# Patient Record
Sex: MALE | Race: WHITE | NOT HISPANIC OR LATINO | Employment: UNEMPLOYED | URBAN - METROPOLITAN AREA
[De-identification: names, ages, dates, MRNs, and addresses within clinical notes are randomized per-mention and may not be internally consistent; named-entity substitution may affect disease eponyms.]

---

## 2021-07-21 ENCOUNTER — HOSPITAL ENCOUNTER (EMERGENCY)
Facility: HOSPITAL | Age: 46
Discharge: HOME | End: 2021-07-21
Attending: EMERGENCY MEDICINE | Admitting: EMERGENCY MEDICINE

## 2021-07-21 VITALS
WEIGHT: 214.51 LBS | RESPIRATION RATE: 17 BRPM | OXYGEN SATURATION: 97 % | HEIGHT: 74 IN | DIASTOLIC BLOOD PRESSURE: 87 MMHG | BODY MASS INDEX: 27.53 KG/M2 | HEART RATE: 86 BPM | TEMPERATURE: 98.5 F | SYSTOLIC BLOOD PRESSURE: 113 MMHG

## 2021-07-21 DIAGNOSIS — Z00.00 NORMAL EXAM: ICD-10-CM

## 2021-07-21 DIAGNOSIS — Z76.0 MEDICATION REFILL: Primary | ICD-10-CM

## 2021-07-21 PROCEDURE — 99282 EMERGENCY DEPT VISIT SF MDM: CPT

## 2021-07-21 RX ORDER — ARIPIPRAZOLE 15 MG/1
15 TABLET ORAL NIGHTLY
Qty: 30 TABLET | Refills: 0 | Status: SHIPPED | OUTPATIENT
Start: 2021-07-21

## 2021-07-21 RX ORDER — CARBAMAZEPINE 200 MG/1
400 TABLET ORAL 2 TIMES DAILY
Qty: 120 TABLET | Refills: 0 | Status: SHIPPED | OUTPATIENT
Start: 2021-07-21

## 2021-07-21 RX ORDER — GABAPENTIN 300 MG/1
300 CAPSULE ORAL 4 TIMES DAILY
Qty: 60 CAPSULE | Refills: 0 | Status: SHIPPED | OUTPATIENT
Start: 2021-07-21

## 2021-07-21 RX ORDER — BENZTROPINE MESYLATE 1 MG/1
1 TABLET ORAL 2 TIMES DAILY
Qty: 60 TABLET | Refills: 0 | Status: SHIPPED | OUTPATIENT
Start: 2021-07-21

## 2021-07-21 NOTE — ED PROVIDER NOTES
"Time: 12:58 PM EDT  Arrived by: private vehicle  Chief Complaint: medication refill  History provided by: patient  History is limited by: N/A    History of Present Illness:  Patient is a 45 y.o. year old male that presents to the emergency department with medication refill. Pt has been unable to get an appointment since they just changed him to a new therapist and is running low on his medications. Pt states he's been feeling \"weird\" and he's missed doses of his Gabapentin.     HPI        Similar Symptoms Previously: no  Recently seen: yes      Patient Care Team  Primary Care Provider: Ami Johnson MD      Past Medical History:     Allergies   Allergen Reactions   • Lithium Hives     History reviewed. No pertinent past medical history.  History reviewed. No pertinent surgical history.  History reviewed. No pertinent family history.    Home Medications:  Prior to Admission medications    Not on File        Social History:   PT  has no history on file for tobacco use, alcohol use, and drug use.    Record Review:  I have reviewed the patient's records in "[employer].     Review of Systems  Review of Systems   Constitutional: Negative for chills and fever.   HENT: Negative for nosebleeds.    Eyes: Negative for redness.   Respiratory: Negative for cough and shortness of breath.    Cardiovascular: Negative for chest pain.   Gastrointestinal: Negative for diarrhea and vomiting.   Genitourinary: Negative for dysuria and frequency.   Musculoskeletal: Negative for back pain and neck pain.   Skin: Negative for rash.   Neurological: Negative for seizures.        Physical Exam  /87 (BP Location: Left arm, Patient Position: Sitting)   Pulse 86   Temp 98.5 °F (36.9 °C) (Oral)   Resp 17   Ht 188 cm (74\")   Wt 97.3 kg (214 lb 8.1 oz)   SpO2 97%   BMI 27.54 kg/m²     Physical Exam  Vitals and nursing note reviewed.   Constitutional:       General: He is awake. He is not in acute distress.  HENT:      Head: Normocephalic " "and atraumatic.      Nose: Nose normal.      Mouth/Throat:      Mouth: Mucous membranes are moist.   Eyes:      General: No scleral icterus.     Pupils: Pupils are equal, round, and reactive to light.   Cardiovascular:      Rate and Rhythm: Normal rate and regular rhythm.      Pulses: Normal pulses.      Heart sounds: Normal heart sounds. No murmur heard.     Pulmonary:      Effort: Pulmonary effort is normal. No respiratory distress.      Breath sounds: Normal breath sounds and air entry. No decreased air movement. No decreased breath sounds, wheezing, rhonchi or rales.   Chest:      Chest wall: No tenderness.   Abdominal:      Palpations: Abdomen is soft.      Tenderness: There is no abdominal tenderness. There is no guarding or rebound.      Hernia: No hernia is present.   Musculoskeletal:         General: No tenderness. Normal range of motion.      Cervical back: Normal range of motion and neck supple. No tenderness.      Right lower leg: No edema.      Left lower leg: No edema.   Skin:     General: Skin is warm and dry.      Findings: No rash.   Neurological:      Mental Status: He is alert and oriented to person, place, and time. Mental status is at baseline.      Sensory: Sensation is intact. No sensory deficit.      Motor: Motor function is intact. No weakness.   Psychiatric:         Mood and Affect: Mood normal.         Behavior: Behavior normal.                  ED Course  /87 (BP Location: Left arm, Patient Position: Sitting)   Pulse 86   Temp 98.5 °F (36.9 °C) (Oral)   Resp 17   Ht 188 cm (74\")   Wt 97.3 kg (214 lb 8.1 oz)   SpO2 97%   BMI 27.54 kg/m²   No results found for this or any previous visit.  Medications - No data to display  No results found.    Procedures/EKGs:  Procedures            Medical Decision Making:                     [facility]     This patient is a pleasant 45-year-old male with history of mood disorder who presents essentially for needing medications refilled, including " Abilify, carbamazepine, Cogentin and Neurontin.    He is in the process of having his therapists changed, and was unable to get a refill in a timely fashion.    He has a normal physical exam and normal vital signs and I do not see any evidence of acute psychosis or suicidal ideation.    I am going to give him a short refill on his psych meds until he can get in with his therapist this week.      Final diagnoses:   Medication refill   Normal exam        Disposition:  ED Disposition     ED Disposition Condition Comment    Discharge Stable           Documentation assistance provided by Marbella Sharma acting as scribe for Yossi Lockett MD. Information recorded by the scribe was done at my direction and has been verified and validated by me.        Marbella Sharma  07/21/21 1318       Marbella Sharma  07/21/21 2793       Yossi Lockett MD  07/21/21 2392

## 2024-03-27 ENCOUNTER — HOSPITAL ENCOUNTER (EMERGENCY)
Facility: HOSPITAL | Age: 49
Discharge: PSYCHIATRIC OR SUBSTANCE USE FACILITY | DRG: 885 | End: 2024-03-27
Attending: EMERGENCY MEDICINE | Admitting: EMERGENCY MEDICINE
Payer: MEDICARE

## 2024-03-27 ENCOUNTER — HOSPITAL ENCOUNTER (INPATIENT)
Facility: HOSPITAL | Age: 49
LOS: 3 days | Discharge: HOME | DRG: 885 | End: 2024-03-30
Attending: PSYCHIATRY & NEUROLOGY | Admitting: PSYCHIATRY & NEUROLOGY
Payer: MEDICARE

## 2024-03-27 VITALS
BODY MASS INDEX: 24.56 KG/M2 | HEIGHT: 75 IN | RESPIRATION RATE: 21 BRPM | HEART RATE: 62 BPM | WEIGHT: 197.53 LBS | OXYGEN SATURATION: 100 % | TEMPERATURE: 97.8 F | DIASTOLIC BLOOD PRESSURE: 75 MMHG | SYSTOLIC BLOOD PRESSURE: 117 MMHG

## 2024-03-27 DIAGNOSIS — R44.0 AUDITORY HALLUCINATIONS: Primary | ICD-10-CM

## 2024-03-27 DIAGNOSIS — F20.1 DISORGANIZED SCHIZOPHRENIA: ICD-10-CM

## 2024-03-27 PROBLEM — F29 PSYCHOSIS: Status: ACTIVE | Noted: 2024-03-27

## 2024-03-27 LAB
ALBUMIN SERPL-MCNC: 4.1 G/DL (ref 3.5–5.2)
ALBUMIN/GLOB SERPL: 1.5 G/DL
ALP SERPL-CCNC: 57 U/L (ref 39–117)
ALT SERPL W P-5'-P-CCNC: 16 U/L (ref 1–41)
AMPHET+METHAMPHET UR QL: NEGATIVE
ANION GAP SERPL CALCULATED.3IONS-SCNC: 7.6 MMOL/L (ref 5–15)
APAP SERPL-MCNC: <5 MCG/ML (ref 0–30)
AST SERPL-CCNC: 14 U/L (ref 1–40)
BARBITURATES UR QL SCN: NEGATIVE
BASOPHILS # BLD AUTO: 0.07 10*3/MM3 (ref 0–0.2)
BASOPHILS NFR BLD AUTO: 1.3 % (ref 0–1.5)
BENZODIAZ UR QL SCN: NEGATIVE
BILIRUB SERPL-MCNC: 0.2 MG/DL (ref 0–1.2)
BUN SERPL-MCNC: 7 MG/DL (ref 6–20)
BUN/CREAT SERPL: 8.6 (ref 7–25)
CALCIUM SPEC-SCNC: 8.8 MG/DL (ref 8.6–10.5)
CANNABINOIDS SERPL QL: NEGATIVE
CHLORIDE SERPL-SCNC: 94 MMOL/L (ref 98–107)
CO2 SERPL-SCNC: 26.4 MMOL/L (ref 22–29)
COCAINE UR QL: NEGATIVE
CREAT SERPL-MCNC: 0.81 MG/DL (ref 0.76–1.27)
DEPRECATED RDW RBC AUTO: 42.2 FL (ref 37–54)
EGFRCR SERPLBLD CKD-EPI 2021: 108.8 ML/MIN/1.73
EOSINOPHIL # BLD AUTO: 0.16 10*3/MM3 (ref 0–0.4)
EOSINOPHIL NFR BLD AUTO: 3 % (ref 0.3–6.2)
ERYTHROCYTE [DISTWIDTH] IN BLOOD BY AUTOMATED COUNT: 12.6 % (ref 12.3–15.4)
ETHANOL BLD-MCNC: <10 MG/DL (ref 0–10)
ETHANOL UR QL: <0.01 %
FENTANYL UR-MCNC: NEGATIVE NG/ML
GLOBULIN UR ELPH-MCNC: 2.7 GM/DL
GLUCOSE SERPL-MCNC: 95 MG/DL (ref 65–99)
HCT VFR BLD AUTO: 34.1 % (ref 37.5–51)
HGB BLD-MCNC: 11.9 G/DL (ref 13–17.7)
HOLD SPECIMEN: NORMAL
HOLD SPECIMEN: NORMAL
IMM GRANULOCYTES # BLD AUTO: 0.01 10*3/MM3 (ref 0–0.05)
IMM GRANULOCYTES NFR BLD AUTO: 0.2 % (ref 0–0.5)
LYMPHOCYTES # BLD AUTO: 1.99 10*3/MM3 (ref 0.7–3.1)
LYMPHOCYTES NFR BLD AUTO: 36.9 % (ref 19.6–45.3)
MCH RBC QN AUTO: 31.9 PG (ref 26.6–33)
MCHC RBC AUTO-ENTMCNC: 34.9 G/DL (ref 31.5–35.7)
MCV RBC AUTO: 91.4 FL (ref 79–97)
METHADONE UR QL SCN: NEGATIVE
MONOCYTES # BLD AUTO: 0.41 10*3/MM3 (ref 0.1–0.9)
MONOCYTES NFR BLD AUTO: 7.6 % (ref 5–12)
NEUTROPHILS NFR BLD AUTO: 2.76 10*3/MM3 (ref 1.7–7)
NEUTROPHILS NFR BLD AUTO: 51 % (ref 42.7–76)
NRBC BLD AUTO-RTO: 0 /100 WBC (ref 0–0.2)
OPIATES UR QL: NEGATIVE
OXYCODONE UR QL SCN: NEGATIVE
PLATELET # BLD AUTO: 289 10*3/MM3 (ref 140–450)
PMV BLD AUTO: 9.2 FL (ref 6–12)
POTASSIUM SERPL-SCNC: 4 MMOL/L (ref 3.5–5.2)
PROT SERPL-MCNC: 6.8 G/DL (ref 6–8.5)
RBC # BLD AUTO: 3.73 10*6/MM3 (ref 4.14–5.8)
SALICYLATES SERPL-MCNC: <0.3 MG/DL
SODIUM SERPL-SCNC: 128 MMOL/L (ref 136–145)
WBC NRBC COR # BLD AUTO: 5.4 10*3/MM3 (ref 3.4–10.8)
WHOLE BLOOD HOLD COAG: NORMAL
WHOLE BLOOD HOLD SPECIMEN: NORMAL

## 2024-03-27 PROCEDURE — 80053 COMPREHEN METABOLIC PANEL: CPT

## 2024-03-27 PROCEDURE — 80307 DRUG TEST PRSMV CHEM ANLYZR: CPT | Performed by: EMERGENCY MEDICINE

## 2024-03-27 PROCEDURE — 85025 COMPLETE CBC W/AUTO DIFF WBC: CPT

## 2024-03-27 PROCEDURE — 36415 COLL VENOUS BLD VENIPUNCTURE: CPT

## 2024-03-27 PROCEDURE — 80179 DRUG ASSAY SALICYLATE: CPT

## 2024-03-27 PROCEDURE — 99285 EMERGENCY DEPT VISIT HI MDM: CPT

## 2024-03-27 PROCEDURE — 80143 DRUG ASSAY ACETAMINOPHEN: CPT

## 2024-03-27 PROCEDURE — 82077 ASSAY SPEC XCP UR&BREATH IA: CPT

## 2024-03-27 RX ORDER — RISPERIDONE 3 MG/1
3 TABLET ORAL 2 TIMES DAILY
Status: DISCONTINUED | OUTPATIENT
Start: 2024-03-27 | End: 2024-03-28

## 2024-03-27 RX ORDER — HALOPERIDOL 5 MG/1
5 TABLET ORAL EVERY 4 HOURS PRN
Status: DISCONTINUED | OUTPATIENT
Start: 2024-03-27 | End: 2024-03-30 | Stop reason: HOSPADM

## 2024-03-27 RX ORDER — HYDROXYZINE PAMOATE 50 MG/1
50 CAPSULE ORAL EVERY 6 HOURS PRN
Status: DISCONTINUED | OUTPATIENT
Start: 2024-03-27 | End: 2024-03-30 | Stop reason: HOSPADM

## 2024-03-27 RX ORDER — GABAPENTIN 300 MG/1
300 CAPSULE ORAL 3 TIMES DAILY
Status: DISCONTINUED | OUTPATIENT
Start: 2024-03-27 | End: 2024-03-30 | Stop reason: HOSPADM

## 2024-03-27 RX ORDER — HYDROXYZINE HYDROCHLORIDE 10 MG/1
1 TABLET, FILM COATED ORAL 3 TIMES DAILY PRN
COMMUNITY
Start: 2024-02-29 | End: 2024-03-30 | Stop reason: HOSPADM

## 2024-03-27 RX ORDER — DIPHENHYDRAMINE HCL 50 MG
50 CAPSULE ORAL EVERY 4 HOURS PRN
Status: DISCONTINUED | OUTPATIENT
Start: 2024-03-27 | End: 2024-03-30 | Stop reason: HOSPADM

## 2024-03-27 RX ORDER — SODIUM CHLORIDE 0.9 % (FLUSH) 0.9 %
10 SYRINGE (ML) INJECTION AS NEEDED
Status: DISCONTINUED | OUTPATIENT
Start: 2024-03-27 | End: 2024-03-27 | Stop reason: HOSPADM

## 2024-03-27 RX ORDER — ALUMINA, MAGNESIA, AND SIMETHICONE 2400; 2400; 240 MG/30ML; MG/30ML; MG/30ML
15 SUSPENSION ORAL EVERY 6 HOURS PRN
Status: DISCONTINUED | OUTPATIENT
Start: 2024-03-27 | End: 2024-03-30 | Stop reason: HOSPADM

## 2024-03-27 RX ORDER — LORAZEPAM 2 MG/ML
2 INJECTION INTRAMUSCULAR EVERY 4 HOURS PRN
Status: DISCONTINUED | OUTPATIENT
Start: 2024-03-27 | End: 2024-03-30 | Stop reason: HOSPADM

## 2024-03-27 RX ORDER — CARBAMAZEPINE 200 MG/1
400 TABLET ORAL 2 TIMES DAILY
Status: DISCONTINUED | OUTPATIENT
Start: 2024-03-27 | End: 2024-03-30 | Stop reason: HOSPADM

## 2024-03-27 RX ORDER — LORAZEPAM 2 MG/1
2 TABLET ORAL EVERY 4 HOURS PRN
Status: DISCONTINUED | OUTPATIENT
Start: 2024-03-27 | End: 2024-03-30 | Stop reason: HOSPADM

## 2024-03-27 RX ORDER — ARIPIPRAZOLE 20 MG/1
20 TABLET ORAL EVERY MORNING
COMMUNITY
Start: 2024-03-06 | End: 2024-03-30 | Stop reason: HOSPADM

## 2024-03-27 RX ORDER — LOPERAMIDE HYDROCHLORIDE 2 MG/1
2 CAPSULE ORAL
Status: DISCONTINUED | OUTPATIENT
Start: 2024-03-27 | End: 2024-03-30 | Stop reason: HOSPADM

## 2024-03-27 RX ORDER — NICOTINE 21 MG/24HR
1 PATCH, TRANSDERMAL 24 HOURS TRANSDERMAL DAILY PRN
Status: DISCONTINUED | OUTPATIENT
Start: 2024-03-27 | End: 2024-03-30 | Stop reason: HOSPADM

## 2024-03-27 RX ORDER — TRAZODONE HYDROCHLORIDE 50 MG/1
100 TABLET ORAL NIGHTLY PRN
Status: DISCONTINUED | OUTPATIENT
Start: 2024-03-27 | End: 2024-03-30 | Stop reason: HOSPADM

## 2024-03-27 RX ORDER — HALOPERIDOL 5 MG/ML
5 INJECTION INTRAMUSCULAR EVERY 4 HOURS PRN
Status: DISCONTINUED | OUTPATIENT
Start: 2024-03-27 | End: 2024-03-30 | Stop reason: HOSPADM

## 2024-03-27 RX ORDER — ACETAMINOPHEN 325 MG/1
650 TABLET ORAL EVERY 4 HOURS PRN
Status: DISCONTINUED | OUTPATIENT
Start: 2024-03-27 | End: 2024-03-30 | Stop reason: HOSPADM

## 2024-03-27 RX ORDER — ACETAMINOPHEN 500 MG
1000 TABLET ORAL ONCE
Status: COMPLETED | OUTPATIENT
Start: 2024-03-27 | End: 2024-03-27

## 2024-03-27 RX ORDER — DIPHENHYDRAMINE HYDROCHLORIDE 50 MG/ML
50 INJECTION INTRAMUSCULAR; INTRAVENOUS EVERY 4 HOURS PRN
Status: DISCONTINUED | OUTPATIENT
Start: 2024-03-27 | End: 2024-03-30 | Stop reason: HOSPADM

## 2024-03-27 RX ADMIN — ACETAMINOPHEN 1000 MG: 500 TABLET ORAL at 20:24

## 2024-03-27 NOTE — ED PROVIDER NOTES
Time: 2:39 PM EDT  Date of encounter:  3/27/2024  Independent Historian/Clinical History and Information was obtained by:   Patient and Nursing Staff    History is limited by: N/A    Chief Complaint: Disorganized thoughts, paranoid schizophrenia      History of Present Illness:  Patientkaren is a 48 y.o. year old male with history of paranoid schizophrenia and on Abilify who presents to the emergency department for evaluation of worsening visual and auditory hallucinations were voiced things are telling him to get up and leave his house.    He denies any suicidal or homicidal thoughts but is having some difficulty describing his issues due to disorganized thoughts.    He is also concerned that the voices are worse than normal and he may have missed a couple doses of his medications and he feels that he needs a psychiatric eval.    It sounds like he is having some difficulty remembering if even has his Abilify or other meds.    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    HPI    Patient Care Team  Primary Care Provider: Ami Johnson MD    Past Medical History:     Allergies   Allergen Reactions    Lithium Hives     Past Medical History:   Diagnosis Date    Anxiety     Bipolar 1 disorder      History reviewed. No pertinent surgical history.  History reviewed. No pertinent family history.    Home Medications:  Prior to Admission medications    Medication Sig Start Date End Date Taking? Authorizing Provider   hydrOXYzine (ATARAX) 10 MG tablet Take 1 tablet by mouth 3 times a day. 2/29/24  Yes Provider, MD Lynette   ARIPiprazole (Abilify) 15 MG tablet Take 1 tablet by mouth Every Night. 7/21/21   Yossi Lockett MD   benztropine (COGENTIN) 1 MG tablet Take 1 tablet by mouth 2 (Two) Times a Day. 7/21/21   Yossi Lockett MD   carBAMazepine (TEGretol) 200 MG tablet Take 2 tablets by mouth 2 (Two) Times a Day. 7/21/21   Yossi Lockett MD   gabapentin (NEURONTIN) 300 MG capsule Take 1  "capsule by mouth 4 (Four) Times a Day. 7/21/21   Yossi Lockett MD        Social History:   Social History     Tobacco Use    Smoking status: Every Day     Types: Cigarettes    Smokeless tobacco: Current     Types: Chew   Substance Use Topics    Drug use: Not Currently         Review of Systems:  Review of Systems   I performed a 10 point review of systems which was all negative, except for the positives found in the HPI above.  Physical Exam:  /86 (BP Location: Right arm, Patient Position: Lying)   Pulse 71   Temp 97.8 °F (36.6 °C) (Oral)   Resp 21   Ht 190.5 cm (75\")   Wt 89.6 kg (197 lb 8.5 oz)   SpO2 100%   BMI 24.69 kg/m²     Physical Exam   General: Awake alert and in no obvious distress, but difficult historian due to scattered or disorganized thoughts    HEENT: Head normocephalic atraumatic, eyes PERRLA EOMI, nose normal, oropharynx normal.    Neck: Supple full range of motion, no meningismus, no lymphadenopathy    Heart: Regular rate and rhythm, no murmurs or rubs, 2+ radial pulses bilaterally    Lungs: Clear to auscultation bilaterally without wheezes or crackles, no respiratory distress    Abdomen: Soft, nontender, nondistended, no rebound or guarding    Skin: Warm, dry, no rash    Musculoskeletal: Normal range of motion, no lower extremity edema    Neurologic: Oriented x3, no motor deficits no sensory deficits    Psychiatric: Mood appears to show signs of mild psychosis but is calm and cooperative, no suicidal or homicidal ideations, reports auditory hallucinations, disorganized thoughts noted concerning for schizophrenia          Procedures:  Procedures      Medical Decision Making:      Comorbidities that affect care:    Paranoid schizophrenia    External Notes reviewed:    Previous Labs: Previous baseline labs are unremarkable.      The following orders were placed and all results were independently analyzed by me:  Orders Placed This Encounter   Procedures    [place] Draw    " Comprehensive Metabolic Panel    Acetaminophen Level    Ethanol    Salicylate Level    Urine Drug Screen - Urine, Clean Catch    CBC Auto Differential    NPO Diet NPO Type: Strict NPO    Continuous Pulse Oximetry    Vital Signs    Undress & Gown    Psych / Access to See    IP General Consult (Use specialty-specific consult if known)    Oxygen Therapy- Nasal Cannula; Titrate 1-6 LPM Per SpO2; 90 - 95%    POC Glucose Once    Insert Peripheral IV    CBC & Differential    Green Top (Gel)    Lavender Top    Gold Top - SST    Light Blue Top       Medications Given in the Emergency Department:  Medications   sodium chloride 0.9 % flush 10 mL (has no administration in time range)        ED Course:         Labs:    Lab Results (last 24 hours)       Procedure Component Value Units Date/Time    CBC & Differential [[id number]]  (Abnormal) Collected: 03/27/24 1329    Specimen: Blood Updated: 03/27/24 1335    Narrative:      The following orders were created for panel order CBC & Differential.  Procedure                               Abnormality         Status                     ---------                               -----------         ------                     CBC Auto Differential[[id number]]        Abnormal            Final result                 Please view results for these tests on the individual orders.    Comprehensive Metabolic Panel [[id number]]  (Abnormal) Collected: 03/27/24 1329    Specimen: Blood Updated: 03/27/24 1357     Glucose 95 mg/dL      BUN 7 mg/dL      Creatinine 0.81 mg/dL      Sodium 128 mmol/L      Potassium 4.0 mmol/L      Chloride 94 mmol/L      CO2 26.4 mmol/L      Calcium 8.8 mg/dL      Total Protein 6.8 g/dL      Albumin 4.1 g/dL      ALT (SGPT) 16 U/L      AST (SGOT) 14 U/L      Alkaline Phosphatase 57 U/L      Total Bilirubin 0.2 mg/dL      Globulin 2.7 gm/dL      A/G Ratio 1.5 g/dL      BUN/Creatinine Ratio 8.6     Anion Gap 7.6 mmol/L      eGFR 108.8 mL/min/1.73     Narrative:      GFR Normal  >60  Chronic Kidney Disease <60  Kidney Failure <15      Acetaminophen Level [[id number]]  (Normal) Collected: 03/27/24 1329    Specimen: Blood Updated: 03/27/24 1357     Acetaminophen <5.0 mcg/mL     Ethanol [[id number]] Collected: 03/27/24 1329    Specimen: Blood Updated: 03/27/24 1357     Ethanol <10 mg/dL      Ethanol % <0.010 %     Narrative:      Ethanol (Plasma)  <10 Essentially Negative    Toxic Concentrations           mg/dL    Flushing, slowing of reflexes    Impaired visual activity         Depression of CNS              >100  Possible Coma                  >300       Salicylate Level [[id number]]  (Normal) Collected: 03/27/24 1329    Specimen: Blood Updated: 03/27/24 1357     Salicylate <0.3 mg/dL     CBC Auto Differential [[id number]]  (Abnormal) Collected: 03/27/24 1329    Specimen: Blood Updated: 03/27/24 1335     WBC 5.40 10*3/mm3      RBC 3.73 10*6/mm3      Hemoglobin 11.9 g/dL      Hematocrit 34.1 %      MCV 91.4 fL      MCH 31.9 pg      MCHC 34.9 g/dL      RDW 12.6 %      RDW-SD 42.2 fl      MPV 9.2 fL      Platelets 289 10*3/mm3      Neutrophil % 51.0 %      Lymphocyte % 36.9 %      Monocyte % 7.6 %      Eosinophil % 3.0 %      Basophil % 1.3 %      Immature Grans % 0.2 %      Neutrophils, Absolute 2.76 10*3/mm3      Lymphocytes, Absolute 1.99 10*3/mm3      Monocytes, Absolute 0.41 10*3/mm3      Eosinophils, Absolute 0.16 10*3/mm3      Basophils, Absolute 0.07 10*3/mm3      Immature Grans, Absolute 0.01 10*3/mm3      nRBC 0.0 /100 WBC     Urine Drug Screen - Urine, Clean Catch [[id number]]  (Normal) Collected: 03/27/24 1449    Specimen: Urine, Clean Catch Updated: 03/27/24 1514     Amphet/Methamphet, Screen Negative     Barbiturates Screen, Urine Negative     Benzodiazepine Screen, Urine Negative     Cocaine Screen, Urine Negative     Opiate Screen Negative     THC, Screen, Urine Negative     Methadone Screen, Urine Negative     Oxycodone Screen, Urine Negative     Fentanyl, Urine Negative     Narrative:      Negative Thresholds Per Drugs Screened:    Amphetamines                 500 ng/ml  Barbiturates                 200 ng/ml  Benzodiazepines              100 ng/ml  Cocaine                      300 ng/ml  Methadone                    300 ng/ml  Opiates                      300 ng/ml  Oxycodone                    100 ng/ml  THC                           50 ng/ml  Fentanyl                       5 ng/ml      The Normal Value for all drugs tested is negative. This report includes final unconfirmed screening results to be used for medical treatment purposes only. Unconfirmed results must not be used for non-medical purposes such as employment or legal testing. Clinical consideration should be applied to any drug of abuse test, particularly when unconfirmed results are used.                     Imaging:    No Radiology Exams Resulted Within Past 24 Hours      Differential Diagnosis and Discussion:    Psychiatric: Differential diagnosis includes but is not limited to depression, psychosis, bipolar disorder, anxiety, manic episode, schizophrenia, and substance abuse.    All labs were reviewed and interpreted by me.    MDM     Amount and/or Complexity of Data Reviewed  Clinical lab tests: reviewed           This patient is a 48-year-old male with paranoid schizophrenia who has been having worsening disorganized thoughts and auditory hallucinations commanding him to get out of the house the last few days.    It sounds that he may have missed some doses of his Abilify med.    He denies any substance abuse and we will check a urine drug screen and labs to medically clear him.    I suspect he needs evaluated from a psychologic standpoint and we will have our psychiatric clinical worker in the ED evaluate him and come up with a plan for urgent management    He was evaluated by our clinical worker in the ED and felt to have significant disorganized thoughts and unable to be safely restarted on his medications as  he is unable to know if he even still has them and it was felt he would be best managed being admitted to inpatient psych unit on [facility].    I will consult with the on-call psychiatrist at [facility].            Patient Care Considerations:          Consultants/Shared Management Plan:    Consultant: I discussed the plan of management and the patient was also evaluated by our psychiatric clinical  in the ED.    Social Determinants of Health:    Patient is independent, reliable, and has access to care.       Disposition and Care Coordination:    Psychiatric Admission: Through independent evaluation of the patient's history and physical and consultation with psychiatry, the patient meets criteria for admission to a psychiatric facility.        Final diagnoses:   Auditory hallucinations   Disorganized schizophrenia        ED Disposition       ED Disposition   DC/Transfer to Behavioral Health    Condition   Stable    Comment   [facility]               This medical record created using voice recognition software.             Yossi Lockett MD  03/27/24 1229       Yossi Lockett MD  03/27/24 0128

## 2024-03-27 NOTE — SIGNIFICANT NOTE
03/27/24 1545   Behavior WDL   Behavior WDL all;WDL   Interactions cooperative   Motor Movement no unusual gestures/mannerisms   Emotion Mood WDL   Emotion/Mood/Affect WDL all;X   Affect flat   Emotion/Mood anxious;depressed   Patient rated anxiety level 10   Patient rated depression level 8   Mental Health   Little Interest or Pleasure in Doing Things 2-->more than half the days   Feeling Down, Depressed or Hopeless 3-->nearly every day   Speech WDL   Speech WDL speech;WDL   Speech clear, coherent   Perceptual State WDL   Perceptual State WDL all;X   Hallucinations auditory;visual;command   Perceptual State derealization   Thought Process WDL   Thought Process WDL all;X   Delusions paranoid   Judgment and Insight insight not appropriate to situation;judgment not appropriate to situation   Thought Content suspiciousness;ideas of reference   Thought Process illogical;flight of ideas   Intellectual Performance WDL   Intellectual Performance WDL all;WDL   Intellectual Performance able to comprehend   Level of Consciousness Alert   Stress   Do you feel stress - tense, restless, nervous, or anxious, or unable to sleep at night because your mind is troubled all the time - these days? Very much   Coping/Stress   Major Change/Loss/Stressor mental health condition   Patient Personal Strengths strong support system   Sources of Support mental health providers;community support   Techniques to [place] with Loss/Stress/Change counseling;medication   Developmental Stage (Eriksson's)   Developmental Stage Stage 7 (35-65 years/Middle Adulthood) Generativity vs. Stagnation   C-SSRS (Recent)   Q1 Wished to be Dead (Past Month) no   Q2 Suicidal Thoughts (Past Month) no   Q6 Suicide Behavior (Lifetime) no   Level of Risk per Screen screen negative   Violence Risk   Feels Like Hurting Others no   Previous Attempt to Harm Others no       SW met with pt this date at bedside. Pt is here because he is battling anxiety and depression really  "badly and it is affecting his ability to take his medications and function. He states that he is hearing voices and seeing things. The voices that he is hearing are telling him to leave his home. HE feels like he is in a different world. He does not remember the last time he took his medications. He reports to be dx paranoid schizophrenic.  He denies substance abuse. He denies HI and SI. He rates his depression an 8 and anxiety \"off the charts\".     NIKOLAS Neri MSW, CSW   "

## 2024-03-28 PROBLEM — Z96.659 HISTORY OF TOTAL KNEE ARTHROPLASTY: Status: ACTIVE | Noted: 2024-03-28

## 2024-03-28 PROCEDURE — 80156 ASSAY CARBAMAZEPINE TOTAL: CPT | Performed by: PSYCHIATRY & NEUROLOGY

## 2024-03-28 PROCEDURE — 80157 ASSAY CARBAMAZEPINE FREE: CPT | Performed by: PSYCHIATRY & NEUROLOGY

## 2024-03-28 RX ORDER — CYCLOBENZAPRINE HCL 10 MG
10 TABLET ORAL NIGHTLY PRN
COMMUNITY
Start: 2024-02-13 | End: 2024-03-30 | Stop reason: HOSPADM

## 2024-03-28 RX ORDER — ZIPRASIDONE HYDROCHLORIDE 20 MG/1
40 CAPSULE ORAL 2 TIMES DAILY WITH MEALS
Status: DISCONTINUED | OUTPATIENT
Start: 2024-03-28 | End: 2024-03-30 | Stop reason: HOSPADM

## 2024-03-28 RX ADMIN — TRAZODONE HYDROCHLORIDE 100 MG: 50 TABLET ORAL at 21:22

## 2024-03-28 RX ADMIN — GABAPENTIN 300 MG: 300 CAPSULE ORAL at 16:34

## 2024-03-28 RX ADMIN — CARBAMAZEPINE 400 MG: 200 TABLET ORAL at 00:07

## 2024-03-28 RX ADMIN — RISPERIDONE 3 MG: 3 TABLET ORAL at 00:06

## 2024-03-28 RX ADMIN — CARBAMAZEPINE 400 MG: 200 TABLET ORAL at 09:01

## 2024-03-28 RX ADMIN — ACETAMINOPHEN 650 MG: 325 TABLET ORAL at 14:38

## 2024-03-28 RX ADMIN — CARBAMAZEPINE 400 MG: 200 TABLET ORAL at 21:16

## 2024-03-28 RX ADMIN — GABAPENTIN 300 MG: 300 CAPSULE ORAL at 09:01

## 2024-03-28 RX ADMIN — ACETAMINOPHEN 650 MG: 325 TABLET ORAL at 21:22

## 2024-03-28 RX ADMIN — RISPERIDONE 3 MG: 3 TABLET ORAL at 09:01

## 2024-03-28 RX ADMIN — ZIPRASIDONE HCL 40 MG: 20 CAPSULE ORAL at 16:34

## 2024-03-28 RX ADMIN — GABAPENTIN 300 MG: 300 CAPSULE ORAL at 00:07

## 2024-03-28 RX ADMIN — GABAPENTIN 300 MG: 300 CAPSULE ORAL at 21:16

## 2024-03-28 NOTE — NURSING NOTE
"Patient arrived to the unit at 2312, as a Voluntary admission from the ED with a diagnosis of Psychosis. Patient arrived via wheelchair, accompanied by an ED tech and security officers x2.  Patient presents as anxious, with apprehensive affect and was cooperative with admission processing, contraband search and psychosocial assessments. Patient reported [facility] at [facility] \"years and years ago\", but cannot recall when.  Patient reports he has been diagnosed with anxiety, and Schizophrenia in the past.  Patient states he came in tonight \"because I just wasn't feeling right, not myself\" and reports that voices and visual hallucinations have been increasing and causing him to feel fearful and anxious.  Patient reports a voice keeps telling to to leave his home, and he wanted to come in \"before I was a problem to anyone.\" Patient states he lives with his brother and his two daughters and his brother has been very supportive of him.  Patient states he has a current psychiatrist and sees a therapist thru [facility] in [place], [place]. Patient states he has felt more stress, has been feeling overwhelmed by his thoughts, and can't concentrate.  Patient states his sleep has been varied and inconsistent, but appetite has been okay. Patient states he may have missed some of his medications recently, but he can't remember.  Patient reports having had a Rt. Total Knee Arthroplasty on 3/23.2022, and wears an elastic knee brace for joint support.  Order obtained form MD to continue usage here on the unit, and extra pillow provided for support.  Patient states he has been managing pain with Extra strength Tylenol and Gabapentin.  Patient has been cooperative since arrival and is able to make needs known.  Patient was provided with his ordered pm medications, and was oriented to his room and the unit.  Patient denies having suicidal or homicidal thoughts, and CFS.  Supportive care and safe environment provided. "

## 2024-03-28 NOTE — H&P
"[facility] [place]   PSYCHIATRIC  HISTORY AND PHYSICAL    Patient Name: Mark Dale  : 1975  MRN: [id number]  Primary Care Physician:  Ami Johnson MD  Date of admission: 3/27/2024    Subjective   Subjective     Chief Complaint: \"Minor psychosis\"    HPI:     Mark Dale is a 48 y.o. male with no chronic medical problems and a history of psychosis admitted on a voluntary basis for exacerbation of psychosis.    Patient today states that he has trouble thinking.  Patient is unable to complete his thoughts and has difficult time conveying what he is thinking during the interview.  He reports he has not been sleeping well.  Cannot tell me what brought him into the hospital or how he got here.    Reports that he has had hallucinations and they have increased lately.  States his medications are no longer working.  Reports that he has been increasingly anxious because of the hallucinations.  Also has increased anxiety because the medicine is no longer working and he feels that he is \"immune\" to the medications.  He reports he has been compliant with medications.    Reports being depressed recently and has been little desire to do things.  Reports his sleep has been okay.  Reports his energy level has been okay.  Reports he does not feel stable psychiatrically and has a hard time discussing exactly what is wrong.    Patient reports he been stable for a very long time and does not like that he is in his current state.  States that he has not been in the hospital very long time and is chagrined that he is hospitalized at this time and that he has had this decompensation.    Discussed medications and he reports his current medicines were not working and is agreeable to a trial of ziprasidone.    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      Review of Systems:      CONSTITUTIONAL: Feels well denies any acute medical problems  PSYCHIATRIC: As documented " in HPI    Personal History     Past Medical History:   Diagnosis Date    Anxiety     Bipolar 1 disorder        History reviewed. No pertinent surgical history.    Past Psychiatric History: Currently under the care of [facility] services    Psychiatric Hospitalizations: First hospitalization here, reports a long history of multiple hospitalizations but cannot remember the last time he was hospitalized    Suicide Attempts: Denies any history of attempts    Prior Treatment and Medications Tried: Medication trials.  Initially started on Risperdal here and states that medicine does not work for him in the past.      Family History: family history is not on file. Otherwise pertinent FHx was reviewed and not pertinent to current issue.    Family Substance Abuse History:None known to patient      Family Suicide History:None known to patient      Social History:     Unreasonable as well [place].  Moved to [place] because his family was here.  He is a high school graduate.  Currently lives with a brother.  He receives disability.  Has never been  and has no children.    Has never been in the     Identifies a spiritual    Reports a history of emotional    Social History     Socioeconomic History    Marital status: Single   Tobacco Use    Smoking status: Every Day    Smokeless tobacco: Current     Types: Chew   Vaping Use    Vaping status: Never Used   Substance and Sexual Activity    Alcohol use: Never    Drug use: Not Currently    Sexual activity: Not Currently       Substance Abuse History: reports that he has been smoking. His smokeless tobacco use includes chew. He reports that he does not currently use drugs. He reports that he does not drink alcohol.    Home Medications:   ARIPiprazole, benztropine, carBAMazepine, cyclobenzaprine, gabapentin, and hydrOXYzine      Allergies:  Allergies   Allergen Reactions    Lithium Hives       Objective   Objective     Vitals:   Temp:  [97.3 °F (36.3 °C)-97.9 °F (36.6 °C)]  "97.7 °F (36.5 °C)  Heart Rate:  [60-76] 71  Resp:  [16-21] 18  BP: (102-133)/(67-91) 104/67    Physical Exam:      CONSTITUTIONAL: Patient is well developed, well nourished, awake and alert.  HEENT: Head and neck are normocephalic and atraumatic.   LUNGS: Even unlabored respirations.  SKIN: Clean, dry, intact.  EXTREMITIES: No clubbing, cyanosis, edema.  MUSCULOSKELETAL: Symmetric body habitus. Spine straight. Strength intact,  NEUROLOGIC: Appropriate. No abnormal movements, good muscle tone.                              Cerebellar: station and gait steady.    Mental Status Exam:     Awake, alert, oriented male appears appropriate for stated age.  He participates in interview.  Has a hard time conveying his thoughts but is able to track conversation appropriately.  There is no restlessness or agitation.  Appears to be of average intelligence and is a fair historian       Hygiene:   good  Cooperation:  Cooperative  Eye Contact:  Good  Psychomotor Behavior:  Appropriate  Affect:  Restricted and Blunted  Mood: \"Okay, not great\"  Speech:  Normal and Rambling  Language: Appropriate  Thought Process:  Disorganized, Circum, and Tangential  Thought Content:  Bizarre  Suicidal:  None  Homicidal:  None  Hallucinations:  Auditory  Delusion:  Paranoid  Memory:  Intact  Orientation:  Person, Place, Time, and Situation  Reliability:   Limited  Insight:  Fair  Judgement:  Fair  Impulse Control:  Fair        Result Review    Result Review:  I have personally reviewed the results from the time of this admission to 3/28/2024 11:31 EDT and agree with these findings:  [x]  Laboratory  []  Microbiology  []  Radiology  []  EKG/Telemetry   []  Cardiology/Vascular   []  Pathology  []  Old records  []  Other:  Most notable findings include: Toxicology negative    Assessment & Plan   Assessment / Plan     Brief Patient Summary:  Mark Dale is a 48 y.o. male who has a long history of psychotic disorder acute exacerbation and medications " not effective.    Active Hospital Problems:  Active Hospital Problems    Diagnosis     **Psychosis        Plan:   Discussed side effects, risk, benefits of Geodon and he is agreeable to a trial of Geodon  States Tegretol is worked very well for him and wants to continue this medication  Admit for safety and stabilization and begin treatment for underlying mood disorder or psychosis with appropriate medications  Attempt to gain collateral information of possible  Work on safety plan  Provide supportive therapy  Patient to engage in all group and individual treatment modalities available including milieu therapy  Work on appropriate disposition follow-up  Estimated length of stay in hospital 4 to 5 days      DVT prophylaxis:  Mechanical DVT prophylaxis orders are present.        CODE STATUS:    Code Status (Patient has no pulse and is not breathing): CPR (Attempt to Resuscitate)  Medical Interventions (Patient has pulse or is breathing): Full Support      Admission Status:  I believe this patient meets inpatient status.      Part of this note may be an electronic transcription/translation of spoken language to printed text using the Dragon dictation system.        Electronically signed by Frankie Whitley MD, 03/28/24, 11:31 AM EDT.

## 2024-03-28 NOTE — PLAN OF CARE
Goal Outcome Evaluation:  Plan of Care Reviewed With: patient  Patient Agreement with Plan of Care: agrees         Patient rated his anxiety 2 and stated that his depression was decreasing but couldn't rate it on a scale. He denied SI/HI/AVH and contracted for safety. He is cooperative, calm and stated that he has a difficult time putting his thoughts into words and that his body wants to rest but his mind does not let him, these thoughts are about his family and things he is regretting, he couldn't be more specific. He was mostly resting during the day in his bed and able to make his needs know. He stated to be ready to get help. Continue to monitor and provide safe environment.

## 2024-03-29 RX ADMIN — GABAPENTIN 300 MG: 300 CAPSULE ORAL at 20:22

## 2024-03-29 RX ADMIN — CARBAMAZEPINE 400 MG: 200 TABLET ORAL at 20:22

## 2024-03-29 RX ADMIN — GABAPENTIN 300 MG: 300 CAPSULE ORAL at 15:27

## 2024-03-29 RX ADMIN — CARBAMAZEPINE 400 MG: 200 TABLET ORAL at 08:44

## 2024-03-29 RX ADMIN — ZIPRASIDONE HCL 40 MG: 20 CAPSULE ORAL at 16:44

## 2024-03-29 RX ADMIN — ZIPRASIDONE HCL 40 MG: 20 CAPSULE ORAL at 08:45

## 2024-03-29 RX ADMIN — ACETAMINOPHEN 650 MG: 325 TABLET ORAL at 15:26

## 2024-03-29 RX ADMIN — GABAPENTIN 300 MG: 300 CAPSULE ORAL at 08:45

## 2024-03-29 NOTE — PLAN OF CARE
Goal Outcome Evaluation:  Plan of Care Reviewed With: patient  Patient Agreement with Plan of Care: agrees  PATIENT ALERT AND ORIENTED AND CALM AND COOPERATIVE WITH STAFF. PATIENT COMPLIANT WITH ALL MEDICATIONS/. PATIENT DENIES S/I, H/I OR HALLUCINATIONS. PATIENT CONTINUES TO STAY WITHDRAWN TO HIS ROOM . WILL CONTINUE TO MONITOR FOR CHANGES IN MOOD OR BEHAVIOR.

## 2024-03-29 NOTE — PROGRESS NOTES
" [facility]     Psychiatric Progress Note    Patient Name: Mark Dale  : 1975  MRN: [id number]  Primary Care Physician:  Ami Johnson MD  Date of admission: 3/27/2024    Subjective   Subjective     Patient seen and chart reviewed, discussed with staff.    Chief Complaint: Psychosis      HPI:     Staff evelia the patient appears to be better.  He reports reduction in hallucinations.  Slept well last night.  Rated anxiety as a 3 and depression as a 2.  Still staff that he is hopeful and feels better now that he is on medication.    Patient today is isolative and regressed to his room.  He does participate fully in interview.  He is calm and cooperative.  Continues to be somewhat guarded and superficial.  Looks a little suspicious but participates in interview does not exhibit any restlessness.  Reports he slept well.  Denies side effects from medications.  Continues to talk about feeling like being in a different world but cannot fully explain what he means.  Continues to have some bizarre thinking but is showing improvement.    He asked about ziprasidone, which has been initiated in his care, and we again discussed medication need to be taken with meals.  States he has had no problems with that today showing interest in his wellbeing and treatment.        Objective   Objective     Vitals:   Temp:  [97.5 °F (36.4 °C)] 97.5 °F (36.4 °C)  Heart Rate:  [65-74] 65  Resp:  [16-18] 18  BP: ()/(62-72) 112/72          Mental Status Exam:      Appearance:   Well-developed, well-nourished, ADLs well attended to  Reliability:   Fair, difficult time expressing himself but improved from yesterday  Eye Contact:   Good  Concentration/Focus:    Good  Behaviors:    No restlessness or agitation  Memory :    Sensorium intact  Speech:    Normal rate and volume  Language:   Appropriate  Mood :    \"Good\"  Affect:    Blunted, somewhat anxious  Thought process:    Superficial, guarded, tangential, some delusional " and paranoid thinking  Thought Content:    Denies suicidal or homicidal ideation, reports hallucinations but they are improved  Insight:   Good  Judgement:    Intact, no behavioral disturbance      Result Review    Result Review:  I have personally reviewed the results from the time of this admission to 3/29/2024 12:00 EDT and agree with these findings:  []  Laboratory  []  Microbiology  []  Radiology  []  EKG/Telemetry   []  Cardiology/Vascular   []  Pathology  []  Old records  []  Other:  Most notable findings include:     Lab Results (last 24 hours)       ** No results found for the last 24 hours. **                Medications:   carBAMazepine, 400 mg, Oral, BID  gabapentin, 300 mg, Oral, TID  ziprasidone, 40 mg, Oral, BID With Meals          Assessment / Plan       Active Hospital Problems:  Active Hospital Problems    Diagnosis    • **Psychosis        Plan:     Continue current treatment protocol and medications as clinically indicated  Work on mood stabilization and abatement of any suicidal ideation or psychosis.  Work on appropriate safety plan  Continue supportive therapy  Patient to engage in all group and individual treatment modalities available on the unit  Obtain collateral information if possible  Titrate medications as clinically indicated  Work on appropriate disposition follow-up including referrals to substance abuse treatment if indicated      Disposition:  I expect patient to be discharged 1 to 2 days.    Part of this note may be an electronic transcription/translation of spoken language to printed text using the Dragon dictation system.         Electronically signed by Frankie Whitley MD, 03/29/24, 12:00 PM EDT.

## 2024-03-29 NOTE — PLAN OF CARE
Goal Outcome Evaluation:  Plan of Care Reviewed With: patient  Patient Agreement with Plan of Care: agrees     Progress: improving.  Patient has been calm, cooperative, quiet, and withdrawn to room.  Patient states he does feel better, and audio and visual hallucinations have improved.  Patient states he is hopeful now that he is back on medication.  Patient appears less apprehensive than at time of admit. Patient encouraged to make needs known.  Patient was cooperative with snack and pm medications, requesting Tylenol for knee pain and accepted offer of Trazodone after he verbalized difficulty with adequate sleep.  Patient rates depression at 2/10, anxiety at 3/10 and denies S/I, H/I. Emotional support and safe environment provided.

## 2024-03-30 VITALS
TEMPERATURE: 97.3 F | OXYGEN SATURATION: 100 % | DIASTOLIC BLOOD PRESSURE: 85 MMHG | WEIGHT: 201.5 LBS | RESPIRATION RATE: 16 BRPM | HEIGHT: 74 IN | SYSTOLIC BLOOD PRESSURE: 121 MMHG | HEART RATE: 69 BPM | BODY MASS INDEX: 25.86 KG/M2

## 2024-03-30 PROBLEM — F20.9 SCHIZOPHRENIA: Status: ACTIVE | Noted: 2024-03-30

## 2024-03-30 RX ORDER — ZIPRASIDONE HYDROCHLORIDE 40 MG/1
40 CAPSULE ORAL 2 TIMES DAILY WITH MEALS
Qty: 60 CAPSULE | Refills: 1 | Status: SHIPPED | OUTPATIENT
Start: 2024-03-30

## 2024-03-30 RX ADMIN — CARBAMAZEPINE 400 MG: 200 TABLET ORAL at 08:18

## 2024-03-30 RX ADMIN — ACETAMINOPHEN 650 MG: 325 TABLET ORAL at 13:50

## 2024-03-30 RX ADMIN — GABAPENTIN 300 MG: 300 CAPSULE ORAL at 08:18

## 2024-03-30 RX ADMIN — HYDROXYZINE PAMOATE 50 MG: 50 CAPSULE ORAL at 03:19

## 2024-03-30 RX ADMIN — ZIPRASIDONE HCL 40 MG: 20 CAPSULE ORAL at 08:18

## 2024-03-30 NOTE — PLAN OF CARE
"Goal Outcome Evaluation:  Plan of Care Reviewed With: patient  Patient Agreement with Plan of Care: agrees     Progress: improving     Patient rating anxiety 7/10 and depression 2/10. Denies HI/SI. Denies AVH. Patient laying in bed calmly upon approach. Voice is soft spoken and patient mumbles at times. Calm and cooperative with RN assessment. Reports he is the \"middle man\" in his family and he hopes that his time here gives his family an opportunity to rely on someone else for the time being. Med compliant. Able to make needs known. Will continue plan of care and provide a safe patient environment.                              "

## 2024-03-30 NOTE — DISCHARGE SUMMARY
[facility]         DISCHARGE SUMMARY    Patient Name: Mark Dale  : 1975  MRN: [id number]    Date of Admission: 3/27/2024  Date of Discharge: 3/30/2024  Primary Care Physician: Ami Johnson MD    Consults       No orders found from 2024 to 3/28/2024.            Presenting Problem:   Psychosis [F29]    Active and Resolved Hospital Problems:  Active Hospital Problems    Diagnosis POA   • **Psychosis [F29] Yes   • Schizophrenia [F20.9] Yes      Resolved Hospital Problems   No resolved problems to display.         Hospital Course     Hospital Course:  Mark Dale is a 48 y.o. male voluntary basis for exacerbation of psychosis.  He was denying any suicidal ideations.    Patient is calm and cooperative.  Patient reports he has been having trouble with his thinking and felt that psychosis has been more of a problem.  Felt that his medications were not effective he needed to change them.  We discussed use of ziprasidone he was agreeable to a trial of ziprasidone.  We initiated ziprasidone at 40 mg twice daily and he is tolerating medication well.    He did want to continue on Tegretol which was previously ordered and stated that it works very well for him.  Was also continued on gabapentin.    Patient was taken off benztropine as it could have a negative effect on his thought processes and some of his reported confusion.  Patient had improvement of his symptoms since he came into the hospital.  He reported feeling some sort of depersonalization which has abated since he came into the hospital.  He has been free of suicidal ideation since he came into the hospital and has remained free of these.  He is calm and cooperative.  Patient stated he is feeling better and feels like he could manage as an outpatient and is asking for discharge and will discharge today        On day of discharge patient is calm, cooperative, engaging, and has no acute agitation or restlessness.  Speech is  "normal rate and volume and language is appropriate, relevant.  Mood is described as \"good\" and affect is constricted.  Thought processes are sequential, future oriented, goal directed, and thought content is negative for suicidal or homicidal ideations, no hallucinations.  Insight is improved, good, and judgment is intact, behavioral disturbance.      DISCHARGE Follow Up Recommendations for labs and diagnostics: Primary care for routine health maintenance, [place], lipid and glucose monitoring, Tegretol level      Day of Discharge     Vital Signs:  Temp:  [97.3 °F (36.3 °C)-97.9 °F (36.6 °C)] 97.3 °F (36.3 °C)  Heart Rate:  [61-69] 69  Resp:  [16] 16  BP: (106-121)/(68-85) 121/85      Pertinent  and/or Most Recent Results     LAB RESULTS:      Lab 03/27/24  1329   WBC 5.40   HEMOGLOBIN 11.9*   HEMATOCRIT 34.1*   PLATELETS 289   NEUTROS ABS 2.76   IMMATURE GRANS (ABS) 0.01   LYMPHS ABS 1.99   MONOS ABS 0.41   EOS ABS 0.16   MCV 91.4         Lab 03/27/24  1329   SODIUM 128*   POTASSIUM 4.0   CHLORIDE 94*   CO2 26.4   ANION GAP 7.6   BUN 7   CREATININE 0.81   EGFR 108.8   GLUCOSE 95   CALCIUM 8.8         Lab 03/27/24  1329   TOTAL PROTEIN 6.8   ALBUMIN 4.1   GLOBULIN 2.7   ALT (SGPT) 16   AST (SGOT) 14   BILIRUBIN 0.2   ALK PHOS 57                                     Lab 03/27/24  1329   ETHANOL PCT <0.010   ETHANOL MGDL <10         Lab 03/27/24  1449   AMPH/METHAM SCREEN, URINE Negative   BENZODIAZEPINE SCREEN, URINE Negative   COCAINE SCREEN, URINE Negative   OPIATES Negative   THC URINE SCREEN Negative   METHADONE SCREEN, URINE Negative     Brief Urine Lab Results       None               XR Knee 1 -2Vws RT    Result Date: 3/25/2024  Impression: Chief Complaint: History of total knee arthroplasty, right · Date of Exam: 3/25/24 · Procedure Performed: XR KNEE 1 -2VWS RT · Performing MD: Ha Mederos Jr., MD · History: Right TKA, interval evaluation. · Impression: Right knee AP and lateral radiographs were " obtained in clinic today.  These are compared to prior films dated March 23, 2023.  No interval changes.  Again is noted a press-fit, cruciate retaining total knee arthroplasty in appropriate position and alignment.  No evidence of osteolysis or loosening.  Radiographically stable right total knee arthroplasty. Interpreted By: Ha Mederos Jr., MD                 Imaging Results (Last 7 Days)       ** No results found for the last 168 hours. **             Labs Pending at Discharge:  Pending Labs       Order Current Status    Carbamazepine Level, Free & Total In process                 Discharge Details        Discharge Medications        New Medications        Instructions Start Date   ziprasidone 40 MG capsule  Commonly known as: GEODON   40 mg, Oral, 2 Times Daily With Meals             Continue These Medications        Instructions Start Date   carBAMazepine 200 MG tablet  Commonly known as: TEGretol   400 mg, Oral, 2 Times Daily      gabapentin 300 MG capsule  Commonly known as: NEURONTIN   300 mg, Oral, 4 Times Daily             Stop These Medications      ARIPiprazole 20 MG tablet  Commonly known as: ABILIFY     benztropine 1 MG tablet  Commonly known as: COGENTIN     cyclobenzaprine 10 MG tablet  Commonly known as: FLEXERIL     hydrOXYzine 10 MG tablet  Commonly known as: ATARAX              Allergies   Allergen Reactions   • Lithium Hives         Discharge Disposition:  Home or Self Care    Diet:  Hospital:  Diet Order   Procedures   • Diet: Regular/House; Fluid Consistency: Thin (IDDSI 0)         Discharge Activity: Ad nina.  Activity Instructions       Activity as Tolerated              Discharge Condition: Stable    CODE STATUS:  Code Status and Medical Interventions:   Ordered at: 03/27/24 1636     Code Status (Patient has no pulse and is not breathing):    CPR (Attempt to Resuscitate)     Medical Interventions (Patient has pulse or is breathing):    Full Support         No future  appointments.    Additional Instructions for the Follow-ups that You Need to Schedule       Discharge Follow-up with PCP   As directed       Currently Documented PCP:    Ami Johnson MD    PCP Phone Number:    [phone]     Follow Up Details: As needed        Discharge Follow-up with Specified Provider: Jones woodson   As directed      To: Jones woodson                Time spent on Discharge including face to face service: 30 minutes    Part of this note may be an electronic transcription/translation of spoken language to printed text using the Dragon dictation system.        Electronically signed by Frankie Whitley MD, 03/30/24, 1:27 PM EDT.

## 2024-03-30 NOTE — PLAN OF CARE
Goal Outcome Evaluation:  Plan of Care Reviewed With: patient  Patient Agreement with Plan of Care: agrees                              Pt has been wtihdrawnt o room.  He denies SI/HI, AVH and contracts for safety. He rates anxiety 2, depression 3. Pt completed safety plan and is discharging today. He is able to make his needs known.

## 2024-04-01 LAB
CARBAMAZEPINE FREE SERPL-MCNC: 1.1 UG/ML (ref 0.6–4.2)
CARBAMAZEPINE SERPL-MCNC: 4.2 UG/ML (ref 4–12)

## 2025-02-06 ENCOUNTER — HOSPITAL ENCOUNTER (EMERGENCY)
Facility: HOSPITAL | Age: 50
Discharge: HOME | End: 2025-02-06
Attending: EMERGENCY MEDICINE
Payer: MEDICARE

## 2025-02-06 VITALS
OXYGEN SATURATION: 99 % | RESPIRATION RATE: 17 BRPM | SYSTOLIC BLOOD PRESSURE: 126 MMHG | DIASTOLIC BLOOD PRESSURE: 89 MMHG | WEIGHT: 226.19 LBS | TEMPERATURE: 98 F | HEIGHT: 74 IN | BODY MASS INDEX: 29.03 KG/M2 | HEART RATE: 76 BPM

## 2025-02-06 DIAGNOSIS — F31.30 BIPOLAR AFFECTIVE DISORDER, CURRENT EPISODE DEPRESSED, CURRENT EPISODE SEVERITY UNSPECIFIED: Primary | ICD-10-CM

## 2025-02-06 LAB
ALBUMIN SERPL-MCNC: 4 G/DL (ref 3.5–5.2)
ALBUMIN/GLOB SERPL: 1.3 G/DL
ALP SERPL-CCNC: 75 U/L (ref 39–117)
ALT SERPL W P-5'-P-CCNC: 11 U/L (ref 1–41)
AMPHET+METHAMPHET UR QL: NEGATIVE
AMPHETAMINES UR QL: NEGATIVE
ANION GAP SERPL CALCULATED.3IONS-SCNC: 8.9 MMOL/L (ref 5–15)
APAP SERPL-MCNC: <5 MCG/ML (ref 0–30)
AST SERPL-CCNC: 13 U/L (ref 1–40)
BARBITURATES UR QL SCN: NEGATIVE
BASOPHILS # BLD AUTO: 0.09 10*3/MM3 (ref 0–0.2)
BASOPHILS NFR BLD AUTO: 1.3 % (ref 0–1.5)
BENZODIAZ UR QL SCN: NEGATIVE
BILIRUB SERPL-MCNC: 0.3 MG/DL (ref 0–1.2)
BUN SERPL-MCNC: 5 MG/DL (ref 6–20)
BUN/CREAT SERPL: 7.1 (ref 7–25)
BUPRENORPHINE SERPL-MCNC: NEGATIVE NG/ML
CALCIUM SPEC-SCNC: 9.1 MG/DL (ref 8.6–10.5)
CANNABINOIDS SERPL QL: NEGATIVE
CHLORIDE SERPL-SCNC: 99 MMOL/L (ref 98–107)
CO2 SERPL-SCNC: 22.1 MMOL/L (ref 22–29)
COCAINE UR QL: NEGATIVE
CREAT SERPL-MCNC: 0.7 MG/DL (ref 0.76–1.27)
DEPRECATED RDW RBC AUTO: 42.6 FL (ref 37–54)
EGFRCR SERPLBLD CKD-EPI 2021: 113 ML/MIN/1.73
EOSINOPHIL # BLD AUTO: 0.2 10*3/MM3 (ref 0–0.4)
EOSINOPHIL NFR BLD AUTO: 2.9 % (ref 0.3–6.2)
ERYTHROCYTE [DISTWIDTH] IN BLOOD BY AUTOMATED COUNT: 12.9 % (ref 12.3–15.4)
ETHANOL BLD-MCNC: <10 MG/DL (ref 0–10)
ETHANOL UR QL: <0.01 %
FENTANYL UR-MCNC: NEGATIVE NG/ML
GLOBULIN UR ELPH-MCNC: 3.2 GM/DL
GLUCOSE SERPL-MCNC: 90 MG/DL (ref 65–99)
HCT VFR BLD AUTO: 38.5 % (ref 37.5–51)
HGB BLD-MCNC: 13.1 G/DL (ref 13–17.7)
HOLD SPECIMEN: NORMAL
HOLD SPECIMEN: NORMAL
IMM GRANULOCYTES # BLD AUTO: 0.01 10*3/MM3 (ref 0–0.05)
IMM GRANULOCYTES NFR BLD AUTO: 0.1 % (ref 0–0.5)
LYMPHOCYTES # BLD AUTO: 2.9 10*3/MM3 (ref 0.7–3.1)
LYMPHOCYTES NFR BLD AUTO: 42.2 % (ref 19.6–45.3)
MCH RBC QN AUTO: 31 PG (ref 26.6–33)
MCHC RBC AUTO-ENTMCNC: 34 G/DL (ref 31.5–35.7)
MCV RBC AUTO: 91.2 FL (ref 79–97)
METHADONE UR QL SCN: NEGATIVE
MONOCYTES # BLD AUTO: 0.49 10*3/MM3 (ref 0.1–0.9)
MONOCYTES NFR BLD AUTO: 7.1 % (ref 5–12)
NEUTROPHILS NFR BLD AUTO: 3.19 10*3/MM3 (ref 1.7–7)
NEUTROPHILS NFR BLD AUTO: 46.4 % (ref 42.7–76)
NRBC BLD AUTO-RTO: 0 /100 WBC (ref 0–0.2)
OPIATES UR QL: NEGATIVE
OXYCODONE UR QL SCN: NEGATIVE
PCP UR QL SCN: NEGATIVE
PLATELET # BLD AUTO: 303 10*3/MM3 (ref 140–450)
PMV BLD AUTO: 9.1 FL (ref 6–12)
POTASSIUM SERPL-SCNC: 4.4 MMOL/L (ref 3.5–5.2)
PROT SERPL-MCNC: 7.2 G/DL (ref 6–8.5)
RBC # BLD AUTO: 4.22 10*6/MM3 (ref 4.14–5.8)
SALICYLATES SERPL-MCNC: <0.3 MG/DL
SODIUM SERPL-SCNC: 130 MMOL/L (ref 136–145)
TRICYCLICS UR QL SCN: NEGATIVE
WBC NRBC COR # BLD AUTO: 6.88 10*3/MM3 (ref 3.4–10.8)
WHOLE BLOOD HOLD COAG: NORMAL
WHOLE BLOOD HOLD SPECIMEN: NORMAL

## 2025-02-06 PROCEDURE — 82077 ASSAY SPEC XCP UR&BREATH IA: CPT

## 2025-02-06 PROCEDURE — 99283 EMERGENCY DEPT VISIT LOW MDM: CPT

## 2025-02-06 PROCEDURE — 85025 COMPLETE CBC W/AUTO DIFF WBC: CPT

## 2025-02-06 PROCEDURE — 80143 DRUG ASSAY ACETAMINOPHEN: CPT

## 2025-02-06 PROCEDURE — 80053 COMPREHEN METABOLIC PANEL: CPT

## 2025-02-06 PROCEDURE — 80307 DRUG TEST PRSMV CHEM ANLYZR: CPT

## 2025-02-06 PROCEDURE — 80179 DRUG ASSAY SALICYLATE: CPT

## 2025-02-06 RX ORDER — SODIUM CHLORIDE 0.9 % (FLUSH) 0.9 %
10 SYRINGE (ML) INJECTION AS NEEDED
Status: DISCONTINUED | OUTPATIENT
Start: 2025-02-06 | End: 2025-02-06 | Stop reason: HOSPADM

## 2025-02-06 NOTE — ED PROVIDER NOTES
Time: 3:21 PM EST  Date of encounter:  2/6/2025  Independent Historian/Clinical History and Information was obtained by:   Patient    History is limited by: N/A    Chief Complaint: Psych eval      History of Present Illness:  Patient is a 49 y.o. year old male who presents to the emergency department for evaluation of psych eval.  Patient is currently on medication for bipolar schizophrenia.  States that they are not helping.  States he has not been able to concentrate.  He called [facility] which advised him to come to the ED.  He denies SI, HI, auditory visual hallucinations.  Patient states he is concerned maybe his Geodon is causing him side effects.  No medical complaints.      Patient Care Team  Primary Care Provider: Ami Johnson MD    Past Medical History:     Allergies   Allergen Reactions    Lithium Hives     Past Medical History:   Diagnosis Date    Anxiety     Bipolar 1 disorder      History reviewed. No pertinent surgical history.  Family History   Problem Relation Age of Onset    Heart disease Mother     Depression Mother     Heart disease Father        Home Medications:  Prior to Admission medications    Medication Sig Start Date End Date Taking? Authorizing Provider   carBAMazepine (TEGretol) 200 MG tablet Take 2 tablets by mouth 2 (Two) Times a Day. 7/21/21   Yossi Lockett MD   gabapentin (NEURONTIN) 300 MG capsule Take 1 capsule by mouth 4 (Four) Times a Day. 7/21/21   Yossi Lockett MD   ziprasidone (GEODON) 40 MG capsule Take 1 capsule by mouth 2 (Two) Times a Day With Meals. Indications: Schizophrenia 3/30/24   Frankie Whitley MD        Social History:   Social History     Tobacco Use    Smoking status: Every Day    Smokeless tobacco: Current     Types: Chew   Vaping Use    Vaping status: Never Used   Substance Use Topics    Alcohol use: Never    Drug use: Not Currently         Review of Systems:  Review of Systems   Constitutional:  Negative for chills and fever.   HENT:  Negative for  "congestion, rhinorrhea and sore throat.    Eyes:  Negative for photophobia.   Respiratory:  Negative for apnea, cough, chest tightness and shortness of breath.    Cardiovascular:  Negative for chest pain and palpitations.   Gastrointestinal:  Negative for abdominal pain, diarrhea, nausea and vomiting.   Endocrine: Negative.    Genitourinary:  Negative for difficulty urinating and dysuria.   Musculoskeletal:  Negative for back pain, joint swelling and myalgias.   Skin:  Negative for color change and wound.   Allergic/Immunologic: Negative.    Neurological:  Negative for seizures and headaches.   Psychiatric/Behavioral:  Positive for decreased concentration. Negative for hallucinations, self-injury and suicidal ideas.    All other systems reviewed and are negative.       Physical Exam:  /89 (BP Location: Right arm, Patient Position: Lying)   Pulse 76   Temp 98 °F (36.7 °C) (Oral)   Resp 17   Ht 188 cm (74\")   Wt 103 kg (226 lb 3.1 oz)   SpO2 99%   BMI 29.04 kg/m²     Physical Exam  Vitals and nursing note reviewed.   Constitutional:       General: He is awake.      Appearance: Normal appearance.   HENT:      Head: Normocephalic and atraumatic.      Nose: Nose normal.      Mouth/Throat:      Mouth: Mucous membranes are moist.   Eyes:      Extraocular Movements: Extraocular movements intact.      Conjunctiva/sclera: Conjunctivae normal.      Pupils: Pupils are equal, round, and reactive to light.   Cardiovascular:      Rate and Rhythm: Normal rate and regular rhythm.      Heart sounds: Normal heart sounds.   Pulmonary:      Effort: Pulmonary effort is normal. No respiratory distress.      Breath sounds: Normal breath sounds. No wheezing, rhonchi or rales.   Abdominal:      General: Bowel sounds are normal. There is no distension.      Palpations: Abdomen is soft.      Tenderness: There is no abdominal tenderness. There is no guarding or rebound.      Comments: No rigidity   Musculoskeletal:         General: " No tenderness. Normal range of motion.      Cervical back: Normal range of motion and neck supple.   Skin:     General: Skin is warm and dry.      Coloration: Skin is not cyanotic or jaundiced.   Neurological:      General: No focal deficit present.      Mental Status: He is alert and oriented to person, place, and time. Mental status is at baseline.   Psychiatric:         Attention and Perception: Attention and perception normal.         Mood and Affect: Mood normal.         Behavior: Behavior normal.      Comments: No active delusions or psychosis                    Medical Decision Making:      Comorbidities that affect care:    Bipolar, schizophrenia    External Notes reviewed:    Hospital Discharge Summary: Psychiatry admission 3/27/2024.  Description: Auditory hallucinations      The following orders were placed and all results were independently analyzed by me:  Orders Placed This Encounter   Procedures    [place] Draw    Comprehensive Metabolic Panel    Acetaminophen Level    Ethanol    Salicylate Level    Urine Drug Screen - Urine, Clean Catch    CBC Auto Differential    Fentanyl, Urine - Urine, Clean Catch    Vital Signs    Continuous Pulse Oximetry    POC Glucose Once    CBC & Differential    Green Top (Gel)    Lavender Top    Gold Top - SST    Light Blue Top       Medications Given in the Emergency Department:  Medications - No data to display     ED Course:    ED Course as of 02/07/25 0150   Thu Feb 06, 2025   1522 --- PROVIDER IN TRIAGE NOTE ---    The patient was evaluated by Hema cortes in triage. Orders were placed and the patient is currently awaiting disposition.    [AJ]      ED Course User Index  [AJ] Hema Miller, PADharaC       Labs:    Lab Results (last 24 hours)       Procedure Component Value Units Date/Time    CBC & Differential [[id number]]  (Normal) Collected: 02/06/25 1757    Specimen: Blood Updated: 02/06/25 1806    Narrative:      The following orders were created for panel  order CBC & Differential.  Procedure                               Abnormality         Status                     ---------                               -----------         ------                     CBC Auto Differential[[id number]]        Normal              Final result                 Please view results for these tests on the individual orders.    Comprehensive Metabolic Panel [[id number]]  (Abnormal) Collected: 02/06/25 1757    Specimen: Blood Updated: 02/06/25 1831     Glucose 90 mg/dL      BUN 5 mg/dL      Creatinine 0.70 mg/dL      Sodium 130 mmol/L      Potassium 4.4 mmol/L      Chloride 99 mmol/L      CO2 22.1 mmol/L      Calcium 9.1 mg/dL      Total Protein 7.2 g/dL      Albumin 4.0 g/dL      ALT (SGPT) 11 U/L      AST (SGOT) 13 U/L      Alkaline Phosphatase 75 U/L      Total Bilirubin 0.3 mg/dL      Globulin 3.2 gm/dL      A/G Ratio 1.3 g/dL      BUN/Creatinine Ratio 7.1     Anion Gap 8.9 mmol/L      eGFR 113.0 mL/min/1.73     Narrative:      GFR Categories in Chronic Kidney Disease (CKD)      GFR Category          GFR (mL/min/1.73)    Interpretation  G1                     90 or greater         Normal or high (1)  G2                      60-89                Mild decrease (1)  G3a                   45-59                Mild to moderate decrease  G3b                   30-44                Moderate to severe decrease  G4                    15-29                Severe decrease  G5                    14 or less           Kidney failure          (1)In the absence of evidence of kidney disease, neither GFR category G1 or G2 fulfill the criteria for CKD.    eGFR calculation 2021 CKD-EPI creatinine equation, which does not include race as a factor    Acetaminophen Level [[id number]]  (Normal) Collected: 02/06/25 1757    Specimen: Blood Updated: 02/06/25 1831     Acetaminophen <5.0 mcg/mL     Ethanol [[id number]] Collected: 02/06/25 1757    Specimen: Blood Updated: 02/06/25 1831     Ethanol <10 mg/dL      Ethanol  % <0.010 %     Narrative:      Ethanol (Plasma)  <10 Essentially Negative    Toxic Concentrations           mg/dL    Flushing, slowing of reflexes    Impaired visual activity         Depression of CNS              >100  Possible Coma                  >300       Salicylate Level [[id number]]  (Normal) Collected: 02/06/25 1757    Specimen: Blood Updated: 02/06/25 1831     Salicylate <0.3 mg/dL     CBC Auto Differential [[id number]]  (Normal) Collected: 02/06/25 1757    Specimen: Blood Updated: 02/06/25 1806     WBC 6.88 10*3/mm3      RBC 4.22 10*6/mm3      Hemoglobin 13.1 g/dL      Hematocrit 38.5 %      MCV 91.2 fL      MCH 31.0 pg      MCHC 34.0 g/dL      RDW 12.9 %      RDW-SD 42.6 fl      MPV 9.1 fL      Platelets 303 10*3/mm3      Neutrophil % 46.4 %      Lymphocyte % 42.2 %      Monocyte % 7.1 %      Eosinophil % 2.9 %      Basophil % 1.3 %      Immature Grans % 0.1 %      Neutrophils, Absolute 3.19 10*3/mm3      Lymphocytes, Absolute 2.90 10*3/mm3      Monocytes, Absolute 0.49 10*3/mm3      Eosinophils, Absolute 0.20 10*3/mm3      Basophils, Absolute 0.09 10*3/mm3      Immature Grans, Absolute 0.01 10*3/mm3      nRBC 0.0 /100 WBC     Urine Drug Screen - Urine, Clean Catch [[id number]]  (Normal) Collected: 02/06/25 1805    Specimen: Urine, Clean Catch Updated: 02/06/25 1837     THC, Screen, Urine Negative     Phencyclidine (PCP), Urine Negative     Cocaine Screen, Urine Negative     Methamphetamine, Ur Negative     Opiate Screen Negative     Amphetamine Screen, Urine Negative     Benzodiazepine Screen, Urine Negative     Tricyclic Antidepressants Screen Negative     Methadone Screen, Urine Negative     Barbiturates Screen, Urine Negative     Oxycodone Screen, Urine Negative     Buprenorphine, Screen, Urine Negative    Narrative:      Cutoff For Drugs Screened:    Amphetamines               500 ng/ml  Barbiturates               200 ng/ml  Benzodiazepines            150 ng/ml  Cocaine                     150 ng/ml  Methadone                  200 ng/ml  Opiates                    100 ng/ml  Phencyclidine               25 ng/ml  THC                         50 ng/ml  Methamphetamine            500 ng/ml  Tricyclic Antidepressants  300 ng/ml  Oxycodone                  100 ng/ml  Buprenorphine               10 ng/ml    The normal value for all drugs tested is negative. This report includes unconfirmed screening results, with the cutoff values listed, to be used for medical treatment purposes only.  Unconfirmed results must not be used for non-medical purposes such as employment or legal testing.  Clinical consideration should be applied to any drug of abuse test, particularly when unconfirmed results are used.      Fentanyl, Urine - Urine, Clean Catch [[id number]]  (Normal) Collected: 02/06/25 1805    Specimen: Urine, Clean Catch Updated: 02/06/25 1842     Fentanyl, Urine Negative    Narrative:      Negative Threshold:      Fentanyl 5 ng/mL     The normal value for the drug tested is negative. This report includes final unconfirmed screening results to be used for medical treatment purposes only. Unconfirmed results must not be used for non-medical purposes such as employment or legal testing. Clinical consideration should be applied to any drug of abuse test, particularly when unconfirmed results are used.                    Imaging:    No Radiology Exams Resulted Within Past 24 Hours      Differential Diagnosis and Discussion:    Psychiatric: Differential diagnosis includes but is not limited to depression, psychosis, bipolar disorder, anxiety, manic episode, schizophrenia, and substance abuse.    PROCEDURES:    Labs were collected in the emergency department and all labs were reviewed and interpreted by me.    No orders to display       Procedures    MDM     Amount and/or Complexity of Data Reviewed  Decide to obtain previous medical records or to obtain history from someone other than the patient: yes                        Patient Care Considerations:    CT HEAD: I considered ordering a noncontrast CT of the head, however patient has no reported head trauma.  No focal neurologic deficits by history or physical exam.      Consultants/Shared Management Plan:    Case management, who evaluated the patient and agrees with discharge home.    Social Determinants of Health:    Patient is independent, reliable, and has access to care.       Disposition and Care Coordination:    Discharged: I considered escalation of care by admitting this patient to the hospital, however the patient has no active delusions or psychosis.  No SI/HI.    I have explained the patient´s condition, diagnoses and treatment plan based on the information available to me at this time. I have answered questions and addressed any concerns. The patient has a good  understanding of the patient´s diagnosis, condition, and treatment plan as can be expected at this point. The vital signs have been stable. The patient´s condition is stable and appropriate for discharge from the emergency department.      The patient will pursue further outpatient evaluation with the primary care physician or other designated or consulting physician as outlined in the discharge instructions. They are agreeable to this plan of care and follow-up instructions have been explained in detail. The patient has received these instructions in written format and has expressed an understanding of the discharge instructions. The patient is aware that any significant change in condition or worsening of symptoms should prompt an immediate return to this or the closest emergency department or call to 911.    Final diagnoses:   Bipolar affective disorder, current episode depressed, current episode severity unspecified        ED Disposition       ED Disposition   Discharge    Condition   Stable    Comment   --               This medical record created using voice recognition software.              Rajan Sellers MD  02/07/25 0150

## 2025-02-07 NOTE — SIGNIFICANT NOTE
"   02/06/25 2008   Behavior WDL   Behavior WDL all;WDL   Interactions cooperative   Motor Movement no unusual gestures/mannerisms   Emotion Mood WDL   Emotion/Mood/Affect WDL all;X   Affect flat   Emotion/Mood appropriate to situation;calm   Speech WDL   Speech WDL speech   Speech clear, coherent   Perceptual State WDL   Perceptual State WDL all;WDL   Hallucinations denies hallucinations   Perceptual State consistent with reality   Thought Process WDL   Thought Process WDL all;X   Delusions paranoid   Judgment and Insight judgment not appropriate to situation   Thought Content logical   Thought Process flight of ideas   Intellectual Performance WDL   Intellectual Performance WDL all;WDL   Intellectual Performance able to comprehend   Level of Consciousness Alert   Coping/Stress   Major Change/Loss/Stressor mental health condition   Patient Personal Strengths resilient   Sources of Support sibling(s)   Techniques to [place] with Loss/Stress/Change counseling;medication   George Suicide Severity Rating Scale (Screener/Recent Self-Report)   1. Wish to be Dead (Past 1 Month) N   2. Non-Specific Active Suicidal Thoughts (Past 1 Month) N   6. Suicidal Behavior (Lifetime) N     SW met with pt this date. Pt states that he is having trouble caring for himself. He states that he doesn't think his meds are working and he is having side effects from them. He states that he see psychiatry once a month or three times a month and a therapist once a month at [facility]. He states his next appointment is on the 20th but he is trying to get a sooner appointment. He states that he is waking up early in the morning and it is taking him several hours to \"wake up\"., He states that he is having headaches that last longer than they usually do as well. He denies HI and SI  and hallucinations. He states that these symptoms started yesterday.     NIKOLAS Neri, MSW, CSW   "

## 2025-02-07 NOTE — DISCHARGE INSTRUCTIONS
Return to the emergency department immediately for any suicidal thoughts, homicidal thoughts or psychosis/hallucinations.